# Patient Record
Sex: MALE | Race: OTHER | HISPANIC OR LATINO | URBAN - METROPOLITAN AREA
[De-identification: names, ages, dates, MRNs, and addresses within clinical notes are randomized per-mention and may not be internally consistent; named-entity substitution may affect disease eponyms.]

---

## 2019-01-14 ENCOUNTER — EMERGENCY (EMERGENCY)
Facility: HOSPITAL | Age: 44
LOS: 1 days | Discharge: HOME | End: 2019-01-14
Attending: EMERGENCY MEDICINE | Admitting: EMERGENCY MEDICINE
Payer: COMMERCIAL

## 2019-01-14 VITALS
TEMPERATURE: 99 F | DIASTOLIC BLOOD PRESSURE: 92 MMHG | SYSTOLIC BLOOD PRESSURE: 145 MMHG | OXYGEN SATURATION: 99 % | RESPIRATION RATE: 18 BRPM | HEART RATE: 76 BPM

## 2019-01-14 VITALS
TEMPERATURE: 99 F | DIASTOLIC BLOOD PRESSURE: 66 MMHG | HEART RATE: 69 BPM | RESPIRATION RATE: 18 BRPM | SYSTOLIC BLOOD PRESSURE: 118 MMHG | OXYGEN SATURATION: 100 %

## 2019-01-14 DIAGNOSIS — K64.9 UNSPECIFIED HEMORRHOIDS: ICD-10-CM

## 2019-01-14 DIAGNOSIS — K62.89 OTHER SPECIFIED DISEASES OF ANUS AND RECTUM: ICD-10-CM

## 2019-01-14 PROCEDURE — 99283 EMERGENCY DEPT VISIT LOW MDM: CPT

## 2019-01-14 RX ORDER — DOCUSATE SODIUM 100 MG
1 CAPSULE ORAL
Qty: 20 | Refills: 0
Start: 2019-01-14

## 2019-01-14 RX ORDER — OXYCODONE AND ACETAMINOPHEN 5; 325 MG/1; MG/1
2 TABLET ORAL ONCE
Qty: 0 | Refills: 0 | Status: DISCONTINUED | OUTPATIENT
Start: 2019-01-14 | End: 2019-01-14

## 2019-01-14 RX ORDER — HYDROCORTISONE 1 %
1 OINTMENT (GRAM) TOPICAL
Qty: 1 | Refills: 0 | OUTPATIENT
Start: 2019-01-14 | End: 2019-01-23

## 2019-01-14 RX ORDER — LIDOCAINE/HYDROCORTISONE AC 3 %-0.5 %
1 CREAM WITH APPLICATOR RECTAL
Qty: 7 | Refills: 0
Start: 2019-01-14 | End: 2019-01-18

## 2019-01-14 RX ADMIN — OXYCODONE AND ACETAMINOPHEN 1 TABLET(S): 5; 325 TABLET ORAL at 17:20

## 2019-01-14 NOTE — ED PROVIDER NOTE - ATTENDING CONTRIBUTION TO CARE
43 m w lump near rectum-- noticed last night while wiping after using toilet  no f/c no drainage  no other sig  pmh  vss  s1s2  lungs cta bl  abd soft nt nd +bs  1.5 x 2.5 small perianal lump- +fluctuant  no thrombosis  jannette neg  IMP- Perianal abscess  I and D required, abx

## 2019-01-14 NOTE — ED PROVIDER NOTE - PROGRESS NOTE DETAILS
Surgery consulted due to concern for possible perianal abscess.  They evaluated and feel this is a large hemorrhoid with minimal thrombus.  For follow up in 1-2 days with Dr Fagan.

## 2019-01-14 NOTE — ED PROVIDER NOTE - NSFOLLOWUPINSTRUCTIONS_ED_ALL_ED_FT
Follow up with Dr Fagan - call tomorrow morning.  Use medications as prescribed.  Use Sitz bath 3-4 times daily.

## 2019-01-14 NOTE — CONSULT NOTE ADULT - ASSESSMENT
43M p/w external hemorrhoid    -No acute intervention at this time  -Patient should followup with Dr. Fagan on Wednesday  -Recommend sitz baths, colace, topical lidocaine, 2.5% hydrocortisone cream  -seen with chief resident and d/w chief resident and Dr. Fagan

## 2019-01-14 NOTE — ED PROVIDER NOTE - CARE PROVIDER_API CALL
Mica Fagan), ColonRectal Surgery; Surgery  [address], [place] [address]  Phone: [phone]  Fax: [phone]

## 2019-01-14 NOTE — ED PROVIDER NOTE - MEDICAL DECISION MAKING DETAILS
Perianal abscess v hemorrhoid.  No large amt of thrombus.  AVSS. no rectal mass on LOKI.  No s/s of acute systemic illness, abdominal exam benign.

## 2019-01-14 NOTE — ED PROVIDER NOTE - OBJECTIVE STATEMENT
43 m c/o tender lump to anus.  Noticed it last night when wiping after defecating.  No difficulty defecating, tenesmus, rectal bleeding or discharge, fever/chills, abdominal pain, or rectal trauma/manipulation/insertion.  He thinks it is a cyst/abscess.

## 2019-01-14 NOTE — CONSULT NOTE ADULT - SUBJECTIVE AND OBJECTIVE BOX
43 PMHx asthma p/w 1d h/o sharp 5/10 rectal pain that began last night. Denies f/c/n/v/CP/SOB/abd pain. +BM/flatus. No blood per rectum. Patient never had this issue before. No family history of IBD. No colonoscopy.    Vital Signs Last 24 Hrs  T(C): 37.1 (14 Jan 2019 16:44), Max: 37.1 (14 Jan 2019 16:44)  T(F): 98.7 (14 Jan 2019 16:44), Max: 98.7 (14 Jan 2019 16:44)  HR: 76 (14 Jan 2019 16:44) (76 - 76)  BP: 145/92 (14 Jan 2019 16:44) (145/92 - 145/92)  BP(mean): --  ABP: --  ABP(mean): --  RR: 18 (14 Jan 2019 16:44) (18 - 18)  SpO2: 99% (14 Jan 2019 16:44) (99% - 99%)    Gen: NAD, AOx3  Pulm: Unlabored breathing, no respiratory distress  Abd: s, NTND  Rectal: hemorrhoid, no tenderness, no bleeding

## 2019-01-14 NOTE — ED PROVIDER NOTE - PHYSICAL EXAMINATION
GEN: awake, alert, NAD  EYES: Clear, Pupils equal and round.  PULM: Lungs clear  CV: RRR S1S2  GI: Abd soft, nontender.  1.5 x 2.5 soft minimally tender, ovoid perianal mass.  No erythema or warmth.  No mass on LOKI.    : Normal external genitalia.  No tenderness over perineum.   MSK: FLOR without difficulty  SKIN: normal color and turgor, no rash  NEURO: Alert, oriented. FLOR normally.  Speech clear.  Gait steady.

## 2019-01-16 ENCOUNTER — EMERGENCY (EMERGENCY)
Facility: HOSPITAL | Age: 44
LOS: 1 days | Discharge: HOME | End: 2019-01-16
Admitting: EMERGENCY MEDICINE
Payer: COMMERCIAL

## 2019-01-16 VITALS
DIASTOLIC BLOOD PRESSURE: 89 MMHG | SYSTOLIC BLOOD PRESSURE: 133 MMHG | OXYGEN SATURATION: 99 % | HEART RATE: 76 BPM | WEIGHT: 183.42 LBS | HEIGHT: 70 IN | TEMPERATURE: 98 F | RESPIRATION RATE: 16 BRPM

## 2019-01-16 DIAGNOSIS — Z79.899 OTHER LONG TERM (CURRENT) DRUG THERAPY: ICD-10-CM

## 2019-01-16 DIAGNOSIS — K64.4 RESIDUAL HEMORRHOIDAL SKIN TAGS: ICD-10-CM

## 2019-01-16 DIAGNOSIS — J45.909 UNSPECIFIED ASTHMA, UNCOMPLICATED: ICD-10-CM

## 2019-01-16 DIAGNOSIS — K62.89 OTHER SPECIFIED DISEASES OF ANUS AND RECTUM: ICD-10-CM

## 2019-01-16 PROCEDURE — 99283 EMERGENCY DEPT VISIT LOW MDM: CPT

## 2019-01-16 RX ORDER — HYDROCORTISONE 1 %
1 OINTMENT (GRAM) TOPICAL
Qty: 1 | Refills: 0
Start: 2019-01-16 | End: 2019-01-29

## 2019-01-16 NOTE — ED ADULT NURSE NOTE - OBJECTIVE STATEMENT
Pt presents to ED with c/o rectal pain, states he was seen for same problem on Monday and given medications and f/u but reports pain is too much and can't wait for f/u appt. No fevers.

## 2019-01-16 NOTE — ED PROVIDER NOTE - MEDICAL DECISION MAKING DETAILS
44 yo male with external hemorrhoid, seen in the ER 2 days ago for the same, d/c home with stool softeners and preparation-H for out pt f/u. Pt was not able to see any surgeon and came back to ER, No signs of thrombosis or perianal abscess. pt stable for out pt care. sitz bath, Anusol , continue stool softeners recommended.

## 2019-01-16 NOTE — ED ADULT TRIAGE NOTE - CHIEF COMPLAINT QUOTE
Pt Co rectal pain.  Pt states "I was seen here on Monday and given cream and stool softeners but I still feel pain and I can't wait for my F/U appt."

## 2019-01-16 NOTE — ED PROVIDER NOTE - OBJECTIVE STATEMENT
44 yo male in the ER c/o rectal pain. Pt was seen in the ER 2 days ago and diagnosed with hemorrhoids. Pt could not schedule an appointment with surgeon and OTC medications is not helping with pain. Pt denies bleeding from the rectum, denies constipations or diarrhea, denies abdominal pain.

## 2019-01-16 NOTE — ED PROVIDER NOTE - GASTROINTESTINAL, MLM
Abdomen soft, non-tender, no guarding. rectal exam- external hemorrhoid+, not thrombosed, no bleeding, no signs of abscess or cellulitis,

## 2019-01-16 NOTE — ED ADULT NURSE NOTE - NSIMPLEMENTINTERV_GEN_ALL_ED
Implemented All Universal Safety Interventions:  [place] to call system. Call bell, personal items and telephone within reach. Instruct patient to call for assistance. Room bathroom lighting operational. Non-slip footwear when patient is off stretcher. Physically safe environment: no spills, clutter or unnecessary equipment. Stretcher in lowest position, wheels locked, appropriate side rails in place.

## 2019-01-16 NOTE — ED PROVIDER NOTE - CARE PROVIDER_API CALL
Marbella Eric), Surgery; Surgical Oncology  [address]  [place] [address]  Phone: [phone]  Fax: [phone]    Sawyer Linares), ColonRectal Surgery; Surgery  [address] [place] [address]  [address]  [place] [address]  Phone: [phone]  Fax: [phone]    Rahul Fisher), ColonRectal Surgery; Surgery  [address]  [address]  [place] [address]  Phone: [phone]  Fax: [phone]

## 2021-06-08 NOTE — ED ADULT NURSE NOTE - CAS EDN DISCHARGE ASSESSMENT
RN cannot approve Refill Request    RN can NOT refill this medication med is not covered by policy/route to provider. Last office visit: 2/28/2020 Pascual Rainey MD Last Physical: 6/8/2017 Last MTM visit: Visit date not found Last visit same specialty: 2/28/2020 Pascual Rainey MD.  Next visit within 3 mo: Visit date not found  Next physical within 3 mo: Visit date not found      Bridget Valencia, [facility] Triage/Med Refill 5/30/2020    Requested Prescriptions   Pending Prescriptions Disp Refills     cyclobenzaprine (FLEXERIL) 5 MG tablet [Pharmacy Med Name: CYCLOBENZAPRINE 5 MG TABLET] 60 tablet 2     Sig: TAKE 1 TABLET BY MOUTH THREE TIMES A DAY AS NEEDED FOR MUSCLE SPASMS       There is no refill protocol information for this order           
Alert and oriented to person, place and time

## 2021-07-06 ENCOUNTER — EMERGENCY (EMERGENCY)
Facility: HOSPITAL | Age: 46
LOS: 1 days | Discharge: HOME | End: 2021-07-06
Admitting: EMERGENCY MEDICINE
Payer: COMMERCIAL

## 2021-07-06 VITALS
TEMPERATURE: 99 F | DIASTOLIC BLOOD PRESSURE: 77 MMHG | SYSTOLIC BLOOD PRESSURE: 145 MMHG | WEIGHT: 190.04 LBS | RESPIRATION RATE: 18 BRPM | HEIGHT: 70 IN | HEART RATE: 97 BPM | OXYGEN SATURATION: 97 %

## 2021-07-06 DIAGNOSIS — M25.521 PAIN IN RIGHT ELBOW: ICD-10-CM
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
## 2021-07-06 DIAGNOSIS — M70.21 OLECRANON BURSITIS, RIGHT ELBOW: ICD-10-CM
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
## 2021-07-06 PROCEDURE — 73080 X-RAY EXAM OF ELBOW: CPT | Mod: 26,RT

## 2021-07-06 PROCEDURE — 99283 EMERGENCY DEPT VISIT LOW MDM: CPT | Mod: 25

## 2021-07-06 PROCEDURE — 73080 X-RAY EXAM OF ELBOW: CPT | Mod: 26

## 2021-07-06 PROCEDURE — 73080 X-RAY EXAM OF ELBOW: CPT

## 2021-07-06 RX ORDER — IBUPROFEN 200 MG
600 TABLET ORAL ONCE
Refills: 0 | Status: COMPLETED | OUTPATIENT
Start: 2021-07-06 | End: 2021-07-06

## 2021-07-06 RX ADMIN — Medication 600 MILLIGRAM(S): at 15:14

## 2021-07-06 NOTE — ED ADULT NURSE NOTE - OBJECTIVE STATEMENT
Pt came to ED complaining of bump to left elbow. Pt reports injury to left elbow several weeks ago.  No drainage, redness, bleeding found at this time. Pt is alert and oriented x3. Denies all other medical complaints at this time. Positive PMS x4 extremities.

## 2021-07-06 NOTE — ED ADULT TRIAGE NOTE - CHIEF COMPLAINT QUOTE
"I have a ball of puss on my elbow. I banged my elbow on the corner about a month ago and the ball was even bigger."

## 2021-07-06 NOTE — ED PROVIDER NOTE - PATIENT PORTAL LINK FT
You can access the FollowMyHealth Patient Portal offered by [facility] by registering at the following website: [email or web address]. By joining [employer]’s FollowMyHealth portal, you will also be able to view your health information using other applications (apps) compatible with our system.

## 2021-07-06 NOTE — ED PROVIDER NOTE - PHYSICAL EXAMINATION
CONSTITUTIONAL: Well-appearing; well-nourished; in no apparent distress.   HEAD: Normocephalic; atraumatic.   EYES: PERRL; EOM intact; conjunctiva and sclera clear  RESPIRATORY: Breathing easily;   MSK: R elbow- + swelling over olecrenon, no erythema, warmth or tenderness, FROM   .

## 2021-07-06 NOTE — ED PROVIDER NOTE - CLINICAL SUMMARY MEDICAL DECISION MAKING FREE TEXT BOX
46 yo m with no pmh c/o R elbow swelling x 4 days. Pt states about 1 month ago he hit his arm on the wall. Had some pain and swelling then but it improved. Pt rested and did not workout. 5 days ago he went back to the gym and was lifting weights and the following day his elbow was swollen. Swelling is actually better than it was on the first day. Denies fever, chills, redness, open wound. R elbow- + swelling over olecrenon, no erythema, warmth or tenderness, FROM 44 yo m with no pmh c/o R elbow swelling x 4 days. Pt states about 1 month ago he hit his arm on the wall. Had some pain and swelling then but it improved. Pt rested and did not workout. 5 days ago he went back to the gym and was lifting weights and the following day his elbow was swollen. Swelling is actually better than it was on the first day. Denies fever, chills, redness, open wound. R elbow- + swelling over olecrenon, no erythema, warmth or tenderness, FROM. XR shows soft tissue swelling, no, fx. +bursitis, will refer to ortho

## 2021-07-06 NOTE — ED PROVIDER NOTE - OBJECTIVE STATEMENT
44 yo m with no pmh c/o R elbow swelling x 4 days. Pt states about 1 month ago he hit his arm on the wall. Had some pain and swelling then but it improved. Pt rested and did not workout. 5 days ago he went back to the gym and was lifting weights and the following day his elbow was swollen. Swelling is actually better than it was on the first day. Denies fever, chills, redness, open wound.

## 2021-07-06 NOTE — ED PROVIDER NOTE - CARE PROVIDER_API CALL
Richard Lizama)  Orthopaedic Surgery  [address] [place] [address]  Phone: [phone]  Fax: [phone]  Follow Up Time:     Malachi Wade)  Orthopaedic Surgery  [address] [place] [address], [address]  [place] [address]  Phone: [phone]  Fax: [phone]  Follow Up Time: